# Patient Record
Sex: FEMALE | Race: WHITE | Employment: FULL TIME | URBAN - METROPOLITAN AREA
[De-identification: names, ages, dates, MRNs, and addresses within clinical notes are randomized per-mention and may not be internally consistent; named-entity substitution may affect disease eponyms.]

---

## 2020-10-26 ENCOUNTER — TELEPHONE (OUTPATIENT)
Dept: PSYCHOLOGY | Age: 25
End: 2020-10-26

## 2020-10-26 ENCOUNTER — OFFICE VISIT (OUTPATIENT)
Dept: FAMILY MEDICINE CLINIC | Age: 25
End: 2020-10-26
Payer: OTHER GOVERNMENT

## 2020-10-26 VITALS
DIASTOLIC BLOOD PRESSURE: 80 MMHG | WEIGHT: 148 LBS | HEIGHT: 65 IN | SYSTOLIC BLOOD PRESSURE: 123 MMHG | HEART RATE: 82 BPM | BODY MASS INDEX: 24.66 KG/M2 | OXYGEN SATURATION: 100 %

## 2020-10-26 PROCEDURE — 99213 OFFICE O/P EST LOW 20 MIN: CPT | Performed by: FAMILY MEDICINE

## 2020-10-26 RX ORDER — NORGESTIMATE AND ETHINYL ESTRADIOL 7DAYSX3 28
KIT ORAL
COMMUNITY
Start: 2020-07-23 | End: 2020-10-26 | Stop reason: SDUPTHER

## 2020-10-26 RX ORDER — NORGESTIMATE AND ETHINYL ESTRADIOL 7DAYSX3 28
1 KIT ORAL DAILY
Qty: 28 TABLET | Refills: 3 | Status: SHIPPED | OUTPATIENT
Start: 2020-10-26 | End: 2021-01-24

## 2020-10-26 SDOH — HEALTH STABILITY: MENTAL HEALTH: HOW MANY STANDARD DRINKS CONTAINING ALCOHOL DO YOU HAVE ON A TYPICAL DAY?: 1 OR 2

## 2020-10-26 SDOH — HEALTH STABILITY: MENTAL HEALTH: HOW OFTEN DO YOU HAVE A DRINK CONTAINING ALCOHOL?: 2-4 TIMES A MONTH

## 2020-10-26 ASSESSMENT — PATIENT HEALTH QUESTIONNAIRE - PHQ9
SUM OF ALL RESPONSES TO PHQ QUESTIONS 1-9: 2
SUM OF ALL RESPONSES TO PHQ QUESTIONS 1-9: 2
1. LITTLE INTEREST OR PLEASURE IN DOING THINGS: 1
2. FEELING DOWN, DEPRESSED OR HOPELESS: 1
SUM OF ALL RESPONSES TO PHQ9 QUESTIONS 1 & 2: 2
SUM OF ALL RESPONSES TO PHQ QUESTIONS 1-9: 2

## 2020-10-26 NOTE — PROGRESS NOTES
Subjective:      Patient ID: Rose Burns is a 23 y.o. female. 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 Psychiatric/Behavioral: The patient is nervous/anxious. Reviewed SIGECAPSM  Has on SI   All other systems reviewed and are negative. Objective:   Physical Exam  Vitals signs reviewed. Constitutional:       Appearance: Normal appearance. Genitourinary:     Comments: Has an GYN  Neurological:      Mental Status: She is alert. Psychiatric:         Judgment: Judgment normal.         Assessment:      No diagnosis found. Situational anxiety and depression   Contraception   Plan:      There are no diagnoses linked to this encounter.     Refer to Dr Dana Holt  RF ocp        Electronically signed by Priti Sharma MD on 10/26/2020 at 8:29 AM

## 2020-11-10 ENCOUNTER — TELEPHONE (OUTPATIENT)
Dept: FAMILY MEDICINE CLINIC | Age: 25
End: 2020-11-10

## 2020-11-10 NOTE — TELEPHONE ENCOUNTER
Patient called and is requesting a note for a emotional support animal due to being diagnosed with depression.

## 2023-01-16 ENCOUNTER — APPOINTMENT (OUTPATIENT)
Dept: ULTRASOUND IMAGING | Age: 28
End: 2023-01-16
Payer: COMMERCIAL

## 2023-01-16 ENCOUNTER — HOSPITAL ENCOUNTER (EMERGENCY)
Age: 28
Discharge: HOME | End: 2023-01-16
Attending: EMERGENCY MEDICINE
Payer: COMMERCIAL

## 2023-01-16 ENCOUNTER — APPOINTMENT (OUTPATIENT)
Dept: CT IMAGING | Age: 28
End: 2023-01-16
Payer: COMMERCIAL

## 2023-01-16 VITALS
BODY MASS INDEX: 24.99 KG/M2 | SYSTOLIC BLOOD PRESSURE: 119 MMHG | HEIGHT: 65 IN | OXYGEN SATURATION: 100 % | TEMPERATURE: 97.9 F | DIASTOLIC BLOOD PRESSURE: 66 MMHG | WEIGHT: 150 LBS | RESPIRATION RATE: 16 BRPM | HEART RATE: 63 BPM

## 2023-01-16 DIAGNOSIS — N39.0 PYURIA DUE TO BACTERIAL URINARY TRACT INFECTION: ICD-10-CM

## 2023-01-16 DIAGNOSIS — N83.9 LESION OF OVARY: Primary | ICD-10-CM

## 2023-01-16 LAB
ALBUMIN SERPL-MCNC: 4 G/DL (ref 3.5–5.1)
ALP BLD-CCNC: 35 U/L (ref 38–126)
ALT SERPL-CCNC: 9 U/L (ref 11–66)
ANION GAP SERPL CALCULATED.3IONS-SCNC: 12 MEQ/L (ref 8–16)
AST SERPL-CCNC: 15 U/L (ref 5–40)
BACTERIA: ABNORMAL /HPF
BASOPHILS # BLD: 0.1 %
BASOPHILS ABSOLUTE: 0 THOU/MM3 (ref 0–0.1)
BILIRUB SERPL-MCNC: 0.4 MG/DL (ref 0.3–1.2)
BILIRUBIN DIRECT: < 0.2 MG/DL (ref 0–0.3)
BILIRUBIN URINE: NEGATIVE
BLOOD, URINE: NEGATIVE
BUN BLDV-MCNC: 11 MG/DL (ref 7–22)
CALCIUM SERPL-MCNC: 9 MG/DL (ref 8.5–10.5)
CASTS 2: ABNORMAL /LPF
CASTS UA: ABNORMAL /LPF
CHARACTER, URINE: CLEAR
CHLORIDE BLD-SCNC: 102 MEQ/L (ref 98–111)
CO2: 23 MEQ/L (ref 23–33)
COLOR: YELLOW
CREAT SERPL-MCNC: 0.7 MG/DL (ref 0.4–1.2)
CRYSTALS, UA: ABNORMAL
EOSINOPHIL # BLD: 1.5 %
EOSINOPHILS ABSOLUTE: 0.1 THOU/MM3 (ref 0–0.4)
EPITHELIAL CELLS, UA: ABNORMAL /HPF
ERYTHROCYTE [DISTWIDTH] IN BLOOD BY AUTOMATED COUNT: 11.7 % (ref 11.5–14.5)
ERYTHROCYTE [DISTWIDTH] IN BLOOD BY AUTOMATED COUNT: 39.6 FL (ref 35–45)
GFR SERPL CREATININE-BSD FRML MDRD: > 60 ML/MIN/1.73M2
GLUCOSE BLD-MCNC: 90 MG/DL (ref 70–108)
GLUCOSE URINE: NEGATIVE MG/DL
HCT VFR BLD CALC: 43 % (ref 37–47)
HEMOGLOBIN: 14.8 GM/DL (ref 12–16)
IMMATURE GRANS (ABS): 0.01 THOU/MM3 (ref 0–0.07)
IMMATURE GRANULOCYTES: 0.1 %
INFLUENZA A: NOT DETECTED
INFLUENZA B: NOT DETECTED
KETONES, URINE: NEGATIVE
LEUKOCYTE ESTERASE, URINE: ABNORMAL
LIPASE: 26.4 U/L (ref 5.6–51.3)
LYMPHOCYTES # BLD: 42.1 %
LYMPHOCYTES ABSOLUTE: 3 THOU/MM3 (ref 1–4.8)
MCH RBC QN AUTO: 32.3 PG (ref 26–33)
MCHC RBC AUTO-ENTMCNC: 34.4 GM/DL (ref 32.2–35.5)
MCV RBC AUTO: 93.9 FL (ref 81–99)
MISCELLANEOUS 2: ABNORMAL
MONOCYTES # BLD: 6 %
MONOCYTES ABSOLUTE: 0.4 THOU/MM3 (ref 0.4–1.3)
NITRITE, URINE: NEGATIVE
NUCLEATED RED BLOOD CELLS: 0 /100 WBC
OSMOLALITY CALCULATION: 272.7 MOSMOL/KG (ref 275–300)
PH UA: 6 (ref 5–9)
PLATELET # BLD: 166 THOU/MM3 (ref 130–400)
PMV BLD AUTO: 10.6 FL (ref 9.4–12.4)
POTASSIUM SERPL-SCNC: 3.6 MEQ/L (ref 3.5–5.2)
PREGNANCY, SERUM: NEGATIVE
PROTEIN UA: NEGATIVE
RBC # BLD: 4.58 MILL/MM3 (ref 4.2–5.4)
RBC URINE: ABNORMAL /HPF
RENAL EPITHELIAL, UA: ABNORMAL
SARS-COV-2 RNA, RT PCR: NOT DETECTED
SEG NEUTROPHILS: 50.2 %
SEGMENTED NEUTROPHILS ABSOLUTE COUNT: 3.6 THOU/MM3 (ref 1.8–7.7)
SODIUM BLD-SCNC: 137 MEQ/L (ref 135–145)
SPECIFIC GRAVITY, URINE: 1.02 (ref 1–1.03)
TOTAL PROTEIN: 6.7 G/DL (ref 6.1–8)
UROBILINOGEN, URINE: 0.2 EU/DL (ref 0–1)
WBC # BLD: 7.1 THOU/MM3 (ref 4.8–10.8)
WBC UA: ABNORMAL /HPF
YEAST: ABNORMAL

## 2023-01-16 PROCEDURE — 6360000002 HC RX W HCPCS: Performed by: STUDENT IN AN ORGANIZED HEALTH CARE EDUCATION/TRAINING PROGRAM

## 2023-01-16 PROCEDURE — 83690 ASSAY OF LIPASE: CPT

## 2023-01-16 PROCEDURE — 87636 SARSCOV2 & INF A&B AMP PRB: CPT

## 2023-01-16 PROCEDURE — 76830 TRANSVAGINAL US NON-OB: CPT

## 2023-01-16 PROCEDURE — 74177 CT ABD & PELVIS W/CONTRAST: CPT

## 2023-01-16 PROCEDURE — 2580000003 HC RX 258: Performed by: STUDENT IN AN ORGANIZED HEALTH CARE EDUCATION/TRAINING PROGRAM

## 2023-01-16 PROCEDURE — 82248 BILIRUBIN DIRECT: CPT

## 2023-01-16 PROCEDURE — 36415 COLL VENOUS BLD VENIPUNCTURE: CPT

## 2023-01-16 PROCEDURE — 80053 COMPREHEN METABOLIC PANEL: CPT

## 2023-01-16 PROCEDURE — 87086 URINE CULTURE/COLONY COUNT: CPT

## 2023-01-16 PROCEDURE — 85025 COMPLETE CBC W/AUTO DIFF WBC: CPT

## 2023-01-16 PROCEDURE — 6360000004 HC RX CONTRAST MEDICATION: Performed by: STUDENT IN AN ORGANIZED HEALTH CARE EDUCATION/TRAINING PROGRAM

## 2023-01-16 PROCEDURE — 81001 URINALYSIS AUTO W/SCOPE: CPT

## 2023-01-16 PROCEDURE — 96375 TX/PRO/DX INJ NEW DRUG ADDON: CPT

## 2023-01-16 PROCEDURE — 84703 CHORIONIC GONADOTROPIN ASSAY: CPT

## 2023-01-16 PROCEDURE — 96374 THER/PROPH/DIAG INJ IV PUSH: CPT

## 2023-01-16 PROCEDURE — 99285 EMERGENCY DEPT VISIT HI MDM: CPT | Performed by: EMERGENCY MEDICINE

## 2023-01-16 RX ORDER — ONDANSETRON 2 MG/ML
4 INJECTION INTRAMUSCULAR; INTRAVENOUS ONCE
Status: COMPLETED | OUTPATIENT
Start: 2023-01-16 | End: 2023-01-16

## 2023-01-16 RX ORDER — CEPHALEXIN 500 MG/1
500 CAPSULE ORAL 2 TIMES DAILY
Qty: 14 CAPSULE | Refills: 0 | Status: SHIPPED | OUTPATIENT
Start: 2023-01-16 | End: 2023-01-23

## 2023-01-16 RX ORDER — NORGESTIMATE AND ETHINYL ESTRADIOL 0.25-0.035
1 KIT ORAL DAILY
COMMUNITY

## 2023-01-16 RX ORDER — KETOROLAC TROMETHAMINE 30 MG/ML
30 INJECTION, SOLUTION INTRAMUSCULAR; INTRAVENOUS ONCE
Status: COMPLETED | OUTPATIENT
Start: 2023-01-16 | End: 2023-01-16

## 2023-01-16 RX ORDER — 0.9 % SODIUM CHLORIDE 0.9 %
1000 INTRAVENOUS SOLUTION INTRAVENOUS ONCE
Status: COMPLETED | OUTPATIENT
Start: 2023-01-16 | End: 2023-01-16

## 2023-01-16 RX ADMIN — KETOROLAC TROMETHAMINE 30 MG: 30 INJECTION, SOLUTION INTRAMUSCULAR; INTRAVENOUS at 07:27

## 2023-01-16 RX ADMIN — SODIUM CHLORIDE 1000 ML: 9 INJECTION, SOLUTION INTRAVENOUS at 07:28

## 2023-01-16 RX ADMIN — ONDANSETRON 4 MG: 2 INJECTION INTRAMUSCULAR; INTRAVENOUS at 07:27

## 2023-01-16 RX ADMIN — IOPAMIDOL 80 ML: 755 INJECTION, SOLUTION INTRAVENOUS at 09:11

## 2023-01-16 ASSESSMENT — PAIN - FUNCTIONAL ASSESSMENT
PAIN_FUNCTIONAL_ASSESSMENT: 0-10

## 2023-01-16 ASSESSMENT — PAIN DESCRIPTION - DESCRIPTORS
DESCRIPTORS: DULL
DESCRIPTORS: ACHING

## 2023-01-16 ASSESSMENT — PAIN DESCRIPTION - FREQUENCY: FREQUENCY: CONTINUOUS

## 2023-01-16 ASSESSMENT — PAIN DESCRIPTION - PAIN TYPE: TYPE: ACUTE PAIN

## 2023-01-16 ASSESSMENT — PAIN SCALES - GENERAL
PAINLEVEL_OUTOF10: 3
PAINLEVEL_OUTOF10: 3
PAINLEVEL_OUTOF10: 4
PAINLEVEL_OUTOF10: 3

## 2023-01-16 ASSESSMENT — PAIN DESCRIPTION - LOCATION: LOCATION: ABDOMEN

## 2023-01-16 ASSESSMENT — PAIN DESCRIPTION - ORIENTATION: ORIENTATION: LOWER

## 2023-01-16 NOTE — DISCHARGE INSTRUCTIONS
For pain use acetaminophen (Tylenol) or ibuprofen (Motrin / Advil), unless prescribed medications that have acetaminophen or ibuprofen (or similar medications) in it. You can take over the counter acetaminophen tablets (1 - 2 tablets of the 500-mg strength every 6 hours) or ibuprofen tablets (2 tablets every 4 hours). You may develop some vaginal bleeding with this problem. Generally, the cysts resolved spontaneously within 6 - 12 weeks. You should follow up for potential repeat ultrasound. PLEASE RETURN TO THE EMERGENCY DEPARTMENT IMMEDIATELY for worsening symptoms, severe vaginal bleeding, vaginal discharge, burning when you urinate, or if you develop any concerning symptoms such as: high fever not relieved by acetaminophen (Tylenol) and/or ibuprofen (Motrin / Advil), chills, shortness of breath, chest pain, feeling of your heart fluttering or racing, persistent nausea and/or vomiting, vomiting up blood, blood in your stool, loss of consciousness, numbness, weakness or tingling in the arms or legs or change in color of the extremities, changes in mental status, persistent headache, blurry vision loss of bladder / bowel control. Return within 8 - 12 hours if you have any of the following: worsening of pain in your abdomen, no food sounds good to you, you continue to vomit, pain goes to your back, have pain in the abdomen when going over a bump in the car or when you jump up and down, unable to follow up with your physician, or other any other care or concern.

## 2023-01-16 NOTE — ED TRIAGE NOTES
Pt presents to the ED through triage with c/c lower abdominal pain that started Saturday. Pt reports that pain is in lower regio, rating pain 4/10, achy while sitting, increasing and becoming sharp when standing. Pt reports LMP 1/3/23. Pt currently on birth control. Last BM yesterday afternoon, reports as normal . Vitals stable.

## 2023-01-16 NOTE — ED PROVIDER NOTES
[facility] [address] [address] EMERGENCY DEPT    EMERGENCY MEDICINE      Pt Name: Sarah Garrido  MRN: [id number]  [place] 1995  Date of evaluation: 1/16/2023  Treating Resident Physician: Kaleb Curran DO  Supervising Physician: [address] [place] [address]       Chief Complaint   Patient presents with    Abdominal Pain     History obtained from chart review and the patient. HISTORY OF PRESENT ILLNESS    HPI    Sarah Garrido is a 34 y.o. female presents to the emergency department for evaluation of abdominal pain. Patient states it begins in the middle of her lower abdomen and occasionally radiates to the right side. This began on Saturday over the weekend during a long car ride. Initially she thought was because she was holding her urine for long periods of time during the trip however persisted afterwards. Does not feel like normal urinary tract infection that she is experienced in the past.  Pain is mildly improved from how it has been over the weekend currently 4 out of 10. No changes in vaginal discharge. Mild associated nausea. Currently taking birth control. Normal bowel movement yesterday. Denies any fevers, chills or new rashes. No difficulty tolerating oral intake. The patient has no other acute complaints at this time. PAST MEDICAL AND SURGICAL HISTORY   History reviewed. No pertinent past medical history. History reviewed. No pertinent surgical history. CURRENT MEDICATIONS     Discharge Medication List as of 1/16/2023 11:52 AM        CONTINUE these medications which have NOT CHANGED    Details   norgestimate-ethinyl estradiol (Ovidio Maya) 0.25-35 MG-MCG per tablet Take 1 tablet by mouth dailyHistorical Med      Norgestim-Eth Estrad Triphasic 0.18/0.215/0.25 MG-35 MCG TABS Take 1 Package by mouth daily, Disp-28 tablet,R-3Normal             ALLERGIES   No Known Allergies    FAMILY HISTORY   History reviewed. No pertinent family history.     SOCIAL HISTORY     Social History     Tobacco Use Smoking status: Never    Smokeless tobacco: Never   Substance Use Topics    Alcohol use: Yes     Alcohol/week: 2.0 standard drinks     Types: 2 Glasses of wine per week    Drug use: Never       PHYSICAL EXAM     ED Triage Vitals [01/16/23 0710]   BP Temp Temp Source Heart Rate Resp SpO2 Height Weight   (!) 149/85 97.9 °F (36.6 °C) Oral 71 16 98 % 5' 5\" (1.651 m) 150 lb (68 kg)     Body mass index is 24.96 kg/m². Initial vital signs and nursing assessment reviewed and normal.    Physical Exam  Constitutional:       General: She is not in acute distress. Appearance: She is not ill-appearing, toxic-appearing or diaphoretic. HENT:      Mouth/Throat:      Mouth: Mucous membranes are moist.      Pharynx: Oropharynx is clear. No oropharyngeal exudate or posterior oropharyngeal erythema. Eyes:      General: No scleral icterus. Right eye: No discharge. Left eye: No discharge. Extraocular Movements: Extraocular movements intact. Conjunctiva/sclera: Conjunctivae normal.   Cardiovascular:      Rate and Rhythm: Normal rate and regular rhythm. Pulses: Normal pulses. Heart sounds: No murmur heard. No friction rub. No gallop. Pulmonary:      Effort: Pulmonary effort is normal.      Breath sounds: No wheezing, rhonchi or rales. Abdominal:      Palpations: Abdomen is soft. Tenderness: There is abdominal tenderness in the right lower quadrant and suprapubic area. There is no guarding or rebound. Musculoskeletal:      Cervical back: Normal range of motion. No rigidity. Skin:     General: Skin is warm and dry. Capillary Refill: Capillary refill takes less than 2 seconds. Neurological:      General: No focal deficit present. Mental Status: She is alert and oriented to person, place, and time.           FORMAL DIAGNOSTIC RESULTS     RADIOLOGY: Interpretation per the Radiologist below, if available at the time of this note (none if blank):    US NON OB TRANSVAGINAL Final Result   The right ovary appears enlarged and contains a mixed echogenicity 3.0 x 2.6 x 2.0 cm cystic lesion possibly an involuting follicle/hemorrhagic cyst versus endometrioma. A moderate amount of free fluid is noted within the pelvis. No discrete fluid    collection or hydrosalpinx is observed. A follow-up pelvic ultrasound in 2-3 menstrual cycles is advised to ensure resolution. **This report has been created using voice recognition software. It may contain minor errors which are inherent in voice recognition technology. **      Final report electronically signed by Dr Shannan Ross on 1/16/2023 11:39 AM      CT ABDOMEN PELVIS W IV CONTRAST Additional Contrast? None   Final Result   1. Fluid in the bilateral adnexa and cul-de-sac and extending to the right cecum. Normal appendix. The findings may indicate hydrosalpinx or tubo-ovarian abscess. **This report has been created using voice recognition software. It may contain minor errors which are inherent in voice recognition technology. **      Final report electronically signed by Dr. Judi Schmidt on 1/16/2023 9:26 AM          LABS: (none if blank)  Labs Reviewed   HEPATIC FUNCTION PANEL - Abnormal; Notable for the following components:       Result Value    Alkaline Phosphatase 35 (*)     ALT 9 (*)     All other components within normal limits   URINE WITH REFLEXED MICRO - Abnormal; Notable for the following components:    Leukocyte Esterase, Urine SMALL (*)     All other components within normal limits   OSMOLALITY - Abnormal; Notable for the following components:    Osmolality Calc 272.7 (*)     All other components within normal limits   COVID-19 & INFLUENZA COMBO   CULTURE, REFLEXED, URINE   CBC WITH AUTO DIFFERENTIAL   BASIC METABOLIC PANEL   LIPASE   HCG, SERUM, QUALITATIVE   ANION GAP   GLOMERULAR FILTRATION RATE, ESTIMATED       (Any cultures that may have been sent were not resulted at the time of this patient visit)    MEDICAL DECISION MAKING     1) Number and Complexity of Problems            Problem List This Visit:         Chief Complaint   Patient presents with    Abdominal Pain           Differential Diagnosis includes (but not limited to):  Gastritis, enteritis, urinary tract infection, pyelonephritis, ovarian cyst        Diagnoses Considered with low index of suspicion:   Appendicitis, cholecystitis, pregnancy, ovarian torsion             Pertinent Comorbid Conditions:        2)  Data Reviewed (none if blank or additional interpretation can be found in ED Course)          My Independent interpretations:     EKG:          Imaging: ED course    Labs:      Pyuria and few bacteria in urine, no electrolyte emergency, negative pregnancy test, no white count. Decision Rules/Clinical Scores utilized:              External Documentation Reviewed:   Previous patient encounter documents & history available on EMR was reviewed as available. This is this patient's first visit to [facility] ED, no previous records available on EMR. Pa Billing 3)  Treatment and Disposition         ED Reassessment: Patient continues to rest comfortably in no acute distress. States her abdominal pain is mildly improved. Case discussed with consulting clinician:           Shared Decision-Making was performed and disposition discussed with the        patient/caregiver at bedside with all questions answered. Reviewed the CT and transvaginal ultrasound results with the patient. Her discomfort is possibly combination of the lesion on her ovary in addition to a possible urinary tract infection. We discussed the importance of antibiotic initiation, anti-inflammatories for pain control and close outpatient follow-up with her obstetrician in [facility] with which she is agreeable.          Social determinants of health impacting treatment or disposition: None         Code Status: Full      Summary of Patient Presentation:      [facility]  Number of Diagnoses or Management Options  Lesion of ovary: new, needed workup  Pyuria due to bacterial urinary tract infection: new, needed workup  Diagnosis management comments: Very pleasant 61-year-old female coming in with lower quadrant abdominal pain. Vital signs are stable without any hypotension, fever, tachycardia, tachypnea or hypoxia. Single partner at home with no history of STI or PID. No abnormal vaginal discharge or vaginal bleeding. Patient taking contraceptive. Normal bowel movements. Nonperitoneal abdomen with suprapubic tenderness to palpation. CT revealed concern for TOA, follow-up ultrasound consistent with mixed echogenicity ovarian cyst.  Do not suspect    /  ED Course as of 01/16/23 1559   Mon Jan 16, 2023   1036 CT ABDOMEN PELVIS W IV CONTRAST Additional Contrast? None  Findings concerning for hydrosalpinx or TOA. Non OB transvaginal ultrasound ordered. [EM]   4987 FW NON OB TRANSVAGINAL  Right ovarian mixed cystic lesion, no hydrosalpinx or concern for tubo-ovarian abscess at this time. [EM]      ED Course User Index  [EM] Liliane Alvarado DO     Vitals Reviewed:    Vitals:    01/16/23 0730 01/16/23 0839 01/16/23 0926 01/16/23 1118   BP: 129/89 109/70 119/68 119/66   Pulse: 66 74 68 63   Resp: 18 16 18 16   Temp:       TempSrc:       SpO2: 99% 100% 100% 100%   Weight:       Height:           The patient was seen and examined. Appropriate diagnostic testing was performed and results reviewed with the patient and/or caregivers. The results of pertinent diagnostic studies and exam findings were discussed. The patients provisional diagnosis and plan of care were discussed with the patient and present caregivers who expressed understanding. Any medications were reviewed and indications and risks of medications were discussed. Strict verbal and written return precautions, instructions and appropriate follow-up were provided to the patient.      ED Medications administered this visit:  (None if blank)  Medications   0.9 % sodium chloride bolus (0 mLs IntraVENous Stopped 1/16/23 0900)   ketorolac (TORADOL) injection 30 mg (30 mg IntraVENous Given 1/16/23 0727)   ondansetron (ZOFRAN) injection 4 mg (4 mg IntraVENous Given 1/16/23 0727)   iopamidol (ISOVUE-370) 76 % injection 80 mL (80 mLs IntraVENous Given 1/16/23 0911)       PROCEDURES: (None if blank)  Procedures:     CRITICAL CARE: (None if blank)    DISCHARGE PRESCRIPTIONS: (None if blank)  Discharge Medication List as of 1/16/2023 11:52 AM        START taking these medications    Details   cephALEXin (KEFLEX) 500 MG capsule Take 1 capsule by mouth 2 times daily for 7 days, Disp-14 capsule, R-0Print               FINAL IMPRESSION     Final diagnoses:   Lesion of ovary   Pyuria due to bacterial urinary tract infection     1. Lesion of ovary    2. Pyuria due to bacterial urinary tract infection        DISPOSITION / PLAN   DISPOSITION Decision To Discharge 01/16/2023 11:49:44 AM      OUTPATIENT FOLLOW UP THE PATIENT:  Donny Cueto [place]  [id number] [facility]  [address] [place] [address]  [phone]    Schedule an appointment as soon as possible for a visit       Your obstetrician  Contact for repeat ultrasound in the next 1 to 2 months. [address] [place] [address] [address] [address] EMERGENCY DEPT  [address]  [phone]    As needed, If symptoms worsen      This transcription was electronically signed. Parts of this transcriptions may have been dictated by use of voice recognition software and electronically transcribed, and parts may have been transcribed with the assistance of an ED scribe. The transcription may contain errors not detected in proofreading. Please refer to my supervising physician's documentation if my documentation differs.     Electronically Signed: Rhonda Mcdonald DO, 01/16/23, 3:59 PM         Rhonda Mcdonald DO  Resident  01/16/23 3277

## 2023-01-16 NOTE — ED NOTES
Pt returned from testing at this time. Vitals stable. Call light in reach.  Pain controlled     Freda Bland RN  01/16/23 5069

## 2023-01-16 NOTE — ED NOTES
Pt resting on cot comfortably with father at the bedside. No concerns voiced. Call light in reach.       Brooke Motta  01/16/23 3911

## 2023-01-18 LAB
ORGANISM: ABNORMAL
URINE CULTURE REFLEX: ABNORMAL